# Patient Record
Sex: MALE | URBAN - METROPOLITAN AREA
[De-identification: names, ages, dates, MRNs, and addresses within clinical notes are randomized per-mention and may not be internally consistent; named-entity substitution may affect disease eponyms.]

---

## 2020-11-19 ENCOUNTER — OFFICE VISIT (OUTPATIENT)
Dept: URBAN - METROPOLITAN AREA CLINIC 54 | Facility: CLINIC | Age: 35
End: 2020-11-19
Payer: COMMERCIAL

## 2020-11-19 PROCEDURE — 92235 FLUORESCEIN ANGRPH MLTIFRAME: CPT | Performed by: OPHTHALMOLOGY

## 2020-11-19 PROCEDURE — 92004 COMPRE OPH EXAM NEW PT 1/>: CPT | Performed by: OPHTHALMOLOGY

## 2020-11-19 PROCEDURE — 92134 CPTRZ OPH DX IMG PST SGM RTA: CPT | Performed by: OPHTHALMOLOGY

## 2020-11-19 ASSESSMENT — INTRAOCULAR PRESSURE
OD: 14
OS: 14

## 2020-11-19 NOTE — IMPRESSION/PLAN
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
-recently had steroid injection in knees. Developed visual symptoms shortly after OCT:
OD: wnl OS: SRF

FA:
OD: WNL
OS: smokestack pattern leakage in central macula Plan: Examination, OCT, and FA reveal a serous macular detachment without evidence of choroidal neovascularization. OCT shows subretinal fluid in the macula. FA shows pooling of fluorescein dye in the subretinal space. The patient does not have a history of severe hypertension, AMD, or systemic autoimmune disease which could be alternative diagnoses. 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 The diagnosis, natural history, and prognosis of central serous retinopathy have been discussed at length. This condition is usally self-limited with resolution seen in about 3-6 months, however, complicating factors such as choroidal neovascularization and retinal/RPE atrophy can cause permanent vision loss. Treatment options include observation, PDT, thermal laser, and systemic medications such as diuretics and mineralcorticoid antagonists. Given the self-limited nature of the disease, observation is recommended at this time. 

RTC 6-8 weeks reeval OCT OU ([facility] in [place])

## 2021-04-19 ENCOUNTER — OFFICE VISIT (OUTPATIENT)
Dept: URBAN - METROPOLITAN AREA CLINIC 47 | Facility: CLINIC | Age: 36
End: 2021-04-19
Payer: COMMERCIAL

## 2021-04-19 DIAGNOSIS — H35.719 CENTRAL SEROUS CHORIORETINOPATHY: Primary | ICD-10-CM

## 2021-04-19 PROCEDURE — 99213 OFFICE O/P EST LOW 20 MIN: CPT | Performed by: OPHTHALMOLOGY

## 2021-04-19 PROCEDURE — 92134 CPTRZ OPH DX IMG PST SGM RTA: CPT | Performed by: OPHTHALMOLOGY

## 2021-04-19 ASSESSMENT — INTRAOCULAR PRESSURE
OS: 12
OD: 15

## 2021-04-19 NOTE — IMPRESSION/PLAN
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
-had steroid injection in knees 11/2020. Developed visual symptoms shortly after OCT:
OD: wnl OS: SRF

FA:
OD: WNL
OS: smokestack pattern leakage in central macula Plan: The patient has central serous chorioretinopathy (CSCR). OCT shows no IRF/SRF OD and PED/SRF OS. We discussed the natural history of this condition, such that the subretinal fluid usually resolves spontaneously over the next several months. I have encouraged the patient to avoid any medications containing steroids (including pills, nasal sprays, inhalers, and topical creams) and stress, if possible. [facility] grid instructions were reviewed. We will observe closely and without treatment at this time. we discussed PDT and/or spirinolactone RTC 8-10 weeks OCT OU

## 2022-08-15 ENCOUNTER — OFFICE VISIT (OUTPATIENT)
Dept: URBAN - METROPOLITAN AREA CLINIC 47 | Facility: CLINIC | Age: 37
End: 2022-08-15
Payer: COMMERCIAL

## 2022-08-15 DIAGNOSIS — H35.719 CENTRAL SEROUS CHORIORETINOPATHY: Primary | ICD-10-CM

## 2022-08-15 PROCEDURE — 92014 COMPRE OPH EXAM EST PT 1/>: CPT | Performed by: OPHTHALMOLOGY

## 2022-08-15 PROCEDURE — 92134 CPTRZ OPH DX IMG PST SGM RTA: CPT | Performed by: OPHTHALMOLOGY

## 2022-08-15 ASSESSMENT — INTRAOCULAR PRESSURE
OD: 15
OS: 15

## 2022-08-15 NOTE — IMPRESSION/PLAN
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
-had steroid injection in knees 11/2020. Developed visual symptoms shortly after FA (11/19/20): OD: WNL
OS: smokestack pattern leakage in central macula (H35.719). Plan: He was lost to f/u and complains of worse vision OD. OCT shows no IRF/SRF OD and PED/SRF OS. We discussed the natural history of this condition, such that the subretinal fluid usually resolves spontaneously over the next several months. I have encouraged the patient to avoid any medications containing steroids (including pills, nasal sprays, inhalers, and topical creams) and stress, if possible. [employer] grid instructions were reviewed. we discussed PDT and/or spirinolactone but he is not interested in any treatment at this time.  
thanks Steven COTE 2-3 months OCT OU

## 2022-11-07 ENCOUNTER — OFFICE VISIT (OUTPATIENT)
Dept: URBAN - METROPOLITAN AREA CLINIC 47 | Facility: CLINIC | Age: 37
End: 2022-11-07
Payer: COMMERCIAL

## 2022-11-07 DIAGNOSIS — H35.719 CENTRAL SEROUS CHORIORETINOPATHY: Primary | ICD-10-CM

## 2022-11-07 PROCEDURE — 92134 CPTRZ OPH DX IMG PST SGM RTA: CPT | Performed by: OPHTHALMOLOGY

## 2022-11-07 PROCEDURE — 99213 OFFICE O/P EST LOW 20 MIN: CPT | Performed by: OPHTHALMOLOGY

## 2022-11-07 ASSESSMENT — INTRAOCULAR PRESSURE
OD: 11
OS: 10

## 2022-11-07 NOTE — IMPRESSION/PLAN
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
-had steroid injection in knees 11/2020. Developed visual symptoms shortly after FA (11/19/20): OD: WNL
OS: smokestack pattern leakage in central macula (H35.719). Plan: He feels his vision is stable. He has nearly resolved SRF OS today. OCT shows no IRF/SRF OD and PED/trace SRF OS. We discussed the natural history of this condition, such that the subretinal fluid usually resolves spontaneously over the next several months. I have encouraged the patient to avoid any medications containing steroids (including pills, nasal sprays, inhalers, and topical creams) and stress, if possible. [employer] grid instructions were reviewed. we discussed PDT and/or spirinolactone but he is not interested in any treatment at this time.  
thanks Steven COTE 3 months OCT OU